# Patient Record
Sex: MALE | Race: WHITE | HISPANIC OR LATINO
[De-identification: names, ages, dates, MRNs, and addresses within clinical notes are randomized per-mention and may not be internally consistent; named-entity substitution may affect disease eponyms.]

---

## 2022-12-19 PROBLEM — Z00.00 ENCOUNTER FOR PREVENTIVE HEALTH EXAMINATION: Status: ACTIVE | Noted: 2022-12-19

## 2023-01-05 ENCOUNTER — APPOINTMENT (OUTPATIENT)
Dept: ORTHOPEDIC SURGERY | Facility: CLINIC | Age: 28
End: 2023-01-05

## 2023-01-26 ENCOUNTER — APPOINTMENT (OUTPATIENT)
Dept: ORTHOPEDIC SURGERY | Facility: CLINIC | Age: 28
End: 2023-01-26
Payer: COMMERCIAL

## 2023-01-26 VITALS
TEMPERATURE: 97.8 F | HEART RATE: 72 BPM | SYSTOLIC BLOOD PRESSURE: 118 MMHG | BODY MASS INDEX: 35.36 KG/M2 | DIASTOLIC BLOOD PRESSURE: 78 MMHG | HEIGHT: 66 IN | WEIGHT: 220 LBS

## 2023-01-26 DIAGNOSIS — G56.80 OTHER SPECIFIED MONONEUROPATHIES OF UNSPECIFIED UPPER LIMB: ICD-10-CM

## 2023-01-26 PROCEDURE — 72070 X-RAY EXAM THORAC SPINE 2VWS: CPT

## 2023-01-26 PROCEDURE — 99203 OFFICE O/P NEW LOW 30 MIN: CPT

## 2023-01-26 NOTE — DISCUSSION/SUMMARY
[de-identified] : Based upon the chronicity of this issue patient will be started into a repeat course of physical therapy.  Also because of the chronicity patient wishes to have some sort of relief in a sooner fashion and he wishes to discuss injection therapy such as trigger point injections etc. patient is can be referred to physical medicine and rehabilitation for discussion with regard to injection therapy which spans the course of trigger points versus epidural facet blocks etc.  Patient can follow-up on a as needed basis if surgical indications arise

## 2023-01-26 NOTE — PHYSICAL EXAM
[de-identified] : CONSTITUTIONAL: The patient is a very pleasant individual who is well-nourished and who appears stated age.\par PSYCHIATRIC: The patient is alert and oriented X 3 and in no apparent distress, and participates with orthopedic evaluation well.\par HEAD: Atraumatic and is nonsyndromic in appearance.\par EENT: No visible thyromegaly, EOMI.\par RESPIRATORY: Respiratory rate is regular, not dyspneic on examination.\par LYMPHATICS: There is no inguinal lymphadenopathy\par INTEGUMENTARY: Skin is clean, dry, and intact about the bilateral lower extremities and lumbar spine.\par VASCULAR: There is brisk capillary refill about the bilateral lower extremities.\par NEUROLOGIC: There are no pathologic reflexes. There is no decrease in sensation of the bilateral lower extremities on Wartenberg pinwheel examination. Deep tendon reflexes are well maintained at 2+/4 of the bilateral lower extremities and are symmetric..\par MUSCULOSKELETAL: There is no visible muscular atrophy. Manual motor strength is well maintained in the bilateral lower extremities. Range of motion of lumbar spine is well maintained. The patient ambulates in a non-myelopathic manner. Negative tension sign and straight leg raise bilaterally. Quad extension, ankle dorsiflexion, EHL, plantar flexion, and ankle eversion are well preserved. 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

## 2023-01-26 NOTE — HISTORY OF PRESENT ILLNESS
[de-identified] : Kael is a very pleasant 27-year-old gentleman who states 9 years ago while playing basketball he took a fall and been suffering from thoracic back pain ever since he states its essentially been untreated he had 1 course of physical therapy which unfortunately worked out to be an aggravating factor.  Overall he states he is becoming worse he now has cervical thoracic and lumbar pain.  No radicular complaints ZACH questionnaire is negative [Worsening] : worsening [___ yrs] : [unfilled] year(s) ago [10] : a maximum pain level of 10/10 [Bending] : worsened by bending [Lifting] : worsened by lifting [None] : No relieving factors are noted [Incontinence] : no incontinence [Loss of Dexterity] : good dexterity [Urinary Ret.] : no urinary retention [de-identified] : Minimal

## 2023-02-16 ENCOUNTER — APPOINTMENT (OUTPATIENT)
Dept: PHYSICAL MEDICINE AND REHAB | Facility: CLINIC | Age: 28
End: 2023-02-16

## 2023-03-10 ENCOUNTER — APPOINTMENT (OUTPATIENT)
Dept: PHYSICAL MEDICINE AND REHAB | Facility: CLINIC | Age: 28
End: 2023-03-10
Payer: SELF-PAY

## 2023-03-10 VITALS
BODY MASS INDEX: 36.1 KG/M2 | DIASTOLIC BLOOD PRESSURE: 78 MMHG | RESPIRATION RATE: 14 BRPM | HEART RATE: 73 BPM | HEIGHT: 67 IN | WEIGHT: 230 LBS | SYSTOLIC BLOOD PRESSURE: 130 MMHG

## 2023-03-10 DIAGNOSIS — M47.816 SPONDYLOSIS W/OUT MYELOPATHY OR RADICULOPATHY, LUMBAR REGION: ICD-10-CM

## 2023-03-10 DIAGNOSIS — Z78.9 OTHER SPECIFIED HEALTH STATUS: ICD-10-CM

## 2023-03-10 DIAGNOSIS — M54.2 CERVICALGIA: ICD-10-CM

## 2023-03-10 DIAGNOSIS — Z87.09 PERSONAL HISTORY OF OTHER DISEASES OF THE RESPIRATORY SYSTEM: ICD-10-CM

## 2023-03-10 DIAGNOSIS — M54.9 DORSALGIA, UNSPECIFIED: ICD-10-CM

## 2023-03-10 DIAGNOSIS — M25.50 PAIN IN UNSPECIFIED JOINT: ICD-10-CM

## 2023-03-10 PROCEDURE — 99204 OFFICE O/P NEW MOD 45 MIN: CPT

## 2023-03-10 NOTE — PHYSICAL EXAM
[FreeTextEntry1] : PE:\par Constitutional: In NAD, calm and cooperative\par MSK (Neck and Back)\par 	Inspection: no gross swelling identified\par 	Palpation: No significant tenderness of the bilateral lower cervical and lumbar paraspinals\par 	ROM: Pain at end cervical extension/flexion and with lumbar flexion>>extension\par 	Strength: 5/5 strength in bilateral upper and lower extremities\par 	Reflexes: 2+ Biceps/Brachioradialis/Triceps/patella/Achilles reflex bilaterally, Fontenot’s/Clonus negative bilaterally\par 	Sensation: Intact to light touch in bilateral upper and lower extremities\par 	Special tests: Modified spurling’s test negative bilaterally, SLR negative bilaterally, JUAN/FADIR negative bilaterally

## 2023-03-10 NOTE — ASSESSMENT
[FreeTextEntry1] : Mr. JAVIER DURON is a 27 year old male who presents with chronic widespread pain, mainly in his neck and low back but can also feel "pressure" in his shoulders, elbows and knees. A review of his spinal imaging shows no areas of severe stenosis or nerve root impingements. He denies any red flag signs. Will recommend:\par - MRIs C/T/L Spine reviewed\par - Given his multiple sites of chronic pain, will refer patient to Rheumatology for workup for possible inflammatory conditions contributing to his pain\par - Patient advised to see a pain psychologist as this pain is now chronic and has been affecting his day to day life. Patient interested in idea and will seek one out. \par - Patient encouraged to continue PT and HEP with ideally going to the gym for a formal gym regimen, for which he agreed. \par \par Return as needed. Patient aware of red flag signs including any changes to their bowel/bladder control, groin numbness or new weakness. Patient knows to seek immediate attention by calling 911 or going to nearest ER if these symptoms appear.

## 2023-03-10 NOTE — DATA REVIEWED
[FreeTextEntry1] : X-ray T Spine 23 reviewed by me: mild degenerative changes seen\par \par PATIENT NAME: Kael Catherine\par PATIENT PHONE NUMBER: [phone]\par PATIENT ID: [id number]\par : 1995\par DATE OF EXAM: 2020\par R. Phys. Name: Dee Woo\par R. Phys. Address: [address] [place] [address]\par R. Phys. Phone: [phone]\par MRI-3T LUMBAR SPINE NON CONTRAST\par \par HISTORY: M54.5 Lower Back Pain R20.0 Numbness Lower/Upper Extremity\par R20.2 Upper and Lower Extremity Pins and Needles M79.605 Left Leg Pain\par \par Technique: Multiplanar, multisequence MRI of the lumbar spine was performed without the\par administration of intravenous contrast .\par \par Findings:\par \par Priors: 2015\par \par Alignment: Alignment is preserved.\par \par Conus: The conus is normal in size and signal.\par \par Bone marrow: No evidence of metastatic disease or acute vertebral body compression\par fracture.\par \par \par \par At L5-S1: No significant spinal canal or neural foraminal stenosis.\par \par At L4-5: There is minimal disc bulge and facet arthropathy without significant spinal\par canal stenosis or nerve root compression. There is no significant change from previous\par study.\par \par At L3-4: No significant spinal canal or neural foraminal stenosis.\par \par At L2-3: No significant spinal canal or neural foraminal stenosis.\par \par At L1-2: No significant spinal canal or neural foraminal stenosis.\par \par IMPRESSION:\par \par \par Mild degenerative change at L4-L5.\par \par No significant spinal canal stenosis or nerve compression at any lumbar level.\par \par ICD 10 -\par Degeneration lumbar spine, M51.36\par \par Signed by: Neetu Nathan MD\par Signed Date: 3/17/2020 12:22 PM EDT\par \par \par SIGNED BY: Neetu Nathan M.D., Ext. [id number] 2020 12:22 PMn\par \par PATIENT NAME: Kael Catherine\par PATIENT PHONE NUMBER: [phone]\par PATIENT ID: [id number]\par : 1995\par DATE OF EXAM: 07/15/2016\par R. Phys. Name: Guy Brooks\par R. Phys. Address: [address] [place] [address], [place] [address]\par R. Phys. Phone: [phone]\par EXAM: MRI-3T THORACIC SPINE W/O CON\par \par HISTORY: M54.6 Thoracic Pain\par \par \par \par TECHNIQUE: This MR study consists of multiple sagittal and axial images of the thoracic\par spine obtained on a 3.0 Reina superconducting magnet.\par \par COMPARISON: Thoracic spine MRI 03/07/15.\par \par FINDINGS:\par SPINAL CORD: Normal cord signal characteristics without edema or myelomalacia. The conus\par terminates in normal position.\par OSSEOUS STRUCTURES: Schmorl's nodes at the thoracolumbar junction unchanged since the\par prior study. No fractures.\par ALIGNMENT: No vertebral body listhesis is present.\par BONE MARROW: Normal bone marrow signal. No focal osseous lesion is seen.\par PARASPINAL SOFT TISSUES: Paraspinal soft tissues are unremarkable.\par \par INTERVERTEBRAL DISCS: Mild disc desiccation and disc height loss T9-T10 and T10-T11\par shallow disc bulges at these levels without significant central canal or foraminal\par stenosis or focal neural impingement. Overall this is unchanged since the prior study.\par \par \par IMPRESSION:\par \par No change since the prior study 03/07/15. Mild degenerative disc disease T9-T10 and\par T10-T11 without neural impingement.\par \par ICD - 10 -\par Degeneration thoracic spine, M51.34\par \par Signed by: Brian Cassidy MD\par Signed Date: 7/15/2016 4:48 PM\par \par \par SIGNED BY: Brian Cassidy M.D., Ext. [id number] 07/15/2016 04:48 PM\par \par PATIENT NAME: Kael Catherine\par PATIENT PHONE NUMBER: [phone]\par PATIENT ID: [id number]\par : 1995\par DATE OF EXAM: 07/15/2016\par R. Phys. Name: Guy Brooks\par R. Phys. Address: [address] [place] [address], [place] [address]\par R. Phys. Phone: [phone]\par EXAM: MRI-3T CERVICAL SPINE W/O CON\par \par HISTORY: M54.2 Cervical/ Neck Pain\par \par \par \par TECHNIQUE: Axial and sagittal images multi-weighted sequences of the cervical spine were\par obtained on a 3.0 Reina magnet.\par \par COMPARISON: Cervical spine x-rays 10/26/15. MRI 11 since .\par \par FINDINGS:\par POSTERIOR FOSSA AND BRAIN STEM: Unremarkable. The cerebellar tonsils terminate in normal\par position without a Chiari malformation.\par SPINAL CORD: Normal cord signal characteristics without edema or myelomalacia from the\par cervical medullary junction to the visualized upper thoracic cord.\par OSSEOUS STRUCTURES: Vertebral bodies are normal height without fractures.\par ALIGNMENT: Cervical hypolordosis with straightening. No listhesis.\par BONE MARROW: Normal bone marrow signal. No focal osseous lesion is seen.\par PARASPINAL SOFT TISSUES: Paraspinal soft tissues are unremarkable.\par \par C2-C3: No disc herniation, central canal, or foraminal stenosis.\par C3-C4: Shallow disc bulge. No significant central canal or foraminal stenosis. This is\par unchanged since the prior study.\par C4-C5: Shallow disc bulge mildly effaces the ventral thecal sac. No significant central\par canal or foraminal stenosis. This is unchanged since the prior study.\par C5-C6: No disc herniation, central canal, or foraminal stenosis.\par C6-C7: No disc herniation, central canal, or foraminal stenosis.\par C7-T1: No disc herniation, central canal, or foraminal stenosis.\par \par IMPRESSION:\par \par Shallow disc bulges C3-C4 and C4-C5 with cervical hypolordosis. Overall, no change since\par 14. No focal neural impingement.\par \par ICD 10 -\par Disc herniation C4-5, C5-6, C6-7, M50.22\par \par Signed by: Brian Cassidy MD\par Signed Date: 7/15/2016 4:43 PM\par \par \par SIGNED BY: Brian Cassidy M.D., Ext. [id number] 07/15/2016 04:43 PM\par

## 2023-03-10 NOTE — HISTORY OF PRESENT ILLNESS
[FreeTextEntry1] : Mr. JAVIER DURON is a 27 year old male who presents with neck and back pain. \par \par Location:Neck and Back, also complains of bilateral elbow, shoulder and knees pains at times\par Onset:Since a fall around 2014 while playing basketball, progressively getting worse\par Provocation/Palliative:Worse with activity, better with rest\par Quality:Stabbing like pain\par Radiation:Can go to L shoulder blade, no radiating pain down arms or legs\par Severity:6.5/10 no, worst 10/10\par Timing:Worse with throughout the day\par \par Denies any persistent numbness. Says his left leg can be weaker at times. Denies any loss of bowel/bladder control or any groin numbness.\par Previous medications trialed:Muscle relaxers but did not like them as they made him emotional, Tumeric, Fish Oil, Naproxen, Motrin without significant relief\par Previous procedures relevant to complaint:None\par Conservative therapy tried?:Has been going to PT, with help from the myofascial massages, has tried Chiropractic care in past with good relief but only for 3 days

## 2024-05-15 ENCOUNTER — APPOINTMENT (OUTPATIENT)
Dept: RADIOLOGY | Facility: CLINIC | Age: 29
End: 2024-05-15
Payer: MEDICAID

## 2024-05-15 PROCEDURE — 71046 X-RAY EXAM CHEST 2 VIEWS: CPT
